# Patient Record
Sex: FEMALE | Race: WHITE | NOT HISPANIC OR LATINO | Employment: STUDENT | URBAN - METROPOLITAN AREA
[De-identification: names, ages, dates, MRNs, and addresses within clinical notes are randomized per-mention and may not be internally consistent; named-entity substitution may affect disease eponyms.]

---
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
## 2021-08-09 ENCOUNTER — OFFICE VISIT (OUTPATIENT)
Dept: OBGYN CLINIC | Facility: CLINIC | Age: 21
End: 2021-08-09

## 2021-08-09 VITALS
SYSTOLIC BLOOD PRESSURE: 132 MMHG | HEIGHT: 64 IN | DIASTOLIC BLOOD PRESSURE: 84 MMHG | BODY MASS INDEX: 30.8 KG/M2 | HEART RATE: 87 BPM | WEIGHT: 180.4 LBS

## 2021-08-09 DIAGNOSIS — N89.8 VAGINAL ITCHING: ICD-10-CM

## 2021-08-09 DIAGNOSIS — B37.3 VAGINAL YEAST INFECTION: Primary | ICD-10-CM

## 2021-08-09 DIAGNOSIS — Z11.3 ROUTINE SCREENING FOR STI (SEXUALLY TRANSMITTED INFECTION): ICD-10-CM

## 2021-08-09 DIAGNOSIS — Z72.51 UNPROTECTED SEX: ICD-10-CM

## 2021-08-09 LAB
BV WHIFF TEST VAG QL: NEGATIVE
CLUE CELLS SPEC QL WET PREP: NEGATIVE
PH SMN: 4 [PH]
SL AMB POCT URINE HCG: NEGATIVE
SL AMB POCT WET MOUNT: POSITIVE
T VAGINALIS VAG QL WET PREP: NEGATIVE
YEAST VAG QL WET PREP: POSITIVE

## 2021-08-09 PROCEDURE — 99202 OFFICE O/P NEW SF 15 MIN: CPT | Performed by: NURSE PRACTITIONER

## 2021-08-09 PROCEDURE — 87591 N.GONORRHOEAE DNA AMP PROB: CPT | Performed by: NURSE PRACTITIONER

## 2021-08-09 PROCEDURE — 87491 CHLMYD TRACH DNA AMP PROBE: CPT | Performed by: NURSE PRACTITIONER

## 2021-08-09 PROCEDURE — 81025 URINE PREGNANCY TEST: CPT | Performed by: NURSE PRACTITIONER

## 2021-08-09 PROCEDURE — 87210 SMEAR WET MOUNT SALINE/INK: CPT | Performed by: NURSE PRACTITIONER

## 2021-08-09 RX ORDER — FLUCONAZOLE 150 MG/1
150 TABLET ORAL ONCE
Qty: 1 TABLET | Refills: 0 | Status: SHIPPED | OUTPATIENT
Start: 2021-08-09 | End: 2021-08-09

## 2021-08-09 NOTE — PATIENT INSTRUCTIONS
Yeast Infection   AMBULATORY CARE:   A yeast infection , or vaginal candidiasis, is a common vaginal infection  A yeast infection is caused by a fungus, or yeast-like germ  Fungi are normally found in your vagina  Too many fungi can cause an infection  Common signs and symptoms:   · Thick, white, cheese-like discharge from your vagina    · Itching, swelling, and redness in your vagina    · Pain or burning when you urinate    · Pain during sexual intercourse    Call your doctor or gynecologist if:   · You have a fever and chills  · You develop abdominal or pelvic pain  · Your discharge is bloody and it is not your monthly period  · Your signs and symptoms get worse, even after treatment  · You have questions or concerns about your condition or care  Treatment for a yeast infection  includes medicines to treat the fungal infection and decrease inflammation  The medicine may be a pill, cream, ointment, or vaginal tablet or suppository  Keep your vagina healthy:   · Clean your genital area with mild soap and warm water each day  Do not get soap inside your vagina  Gently dry the area after washing  Do not use hot tubs  The heat and moisture from hot tubs can increase your risk for another yeast infection  · Always wipe from front to back  after you use the toilet  This prevents spreading bacteria from your rectal area into your vagina  · Do not wear tight-fitting clothes or undergarments  for long periods of time  Wear cotton underwear during the day  Cotton helps keep your genital area dry and does not hold in warmth or moisture  Do not wear underwear at night  · Do not douche  or use feminine hygiene sprays or bubble bath  Do not use pads or tampons that are scented, or colored or perfumed toilet paper  · Do not have sex until your symptoms go away  Have your partner wear a condom until you complete your course of medication      · Ask your healthcare provider about birth control options if necessary  Condoms have latex and diaphragms have gel that kills sperm  Both of these may irritate your genital area  Follow up with your doctor or gynecologist as directed:  Write down your questions so you remember to ask them during your visits  © Copyright [employer] 2021 Information is for End User's use only and may not be sold, redistributed or otherwise used for commercial purposes  All illustrations and images included in CareNotes® are the copyrighted property of [employer]  or Ryan Urena  The above information is an  only  It is not intended as medical advice for individual conditions or treatments  Talk to your doctor, nurse or pharmacist before following any medical regimen to see if it is safe and effective for you

## 2021-08-09 NOTE — PROGRESS NOTES
Assessment/Plan:    No problem-specific Assessment & Plan notes found for this encounter  Return to office after 22st birthday for 1st Pap smear     Diagnoses and all orders for this visit:    Vaginal yeast infection  -     fluconazole (DIFLUCAN) 150 mg tablet; Take 1 tablet (150 mg total) by mouth once for 1 dose   Discussed with patient yeast infection and treatment  Desires fluconazole for treatment  Reviewed prevention,    recommend to  bathe with mild soap such as Dove unscented,   avoid scented products, change clothing when moist,  avoid tight restrictive clothing  Reviewed to avoid sugars in simple carbohydrates  Vaginal itching  -     POCT wet mount  Positive for yeast hyphae  Routine screening for STI (sexually transmitted infection)  Will call results patient  Reviewed STD prevention, recommend condom use  Declines contraception today  Return to office if desires contraception        Subjective:      Patient ID: Reagan Weinberg is a 22 y o  female  HPI 23 yo new pt G0 here with itching and labia feels irritated  Has a thick curdy vaginal discharge  Denies any antibiotic use  A hot shower makes it better  No aggravating factors  Has had her symptoms for 4 days  Sexual debut about 2 weeks ago  First time also for her partner  Patient consents to testing for chlamydia gonorrhea today, declines other STD testing  LMP 7/25/2021  Patient reports she took a Plan B the day and consensual intercourse on 07/24/2021 and then got her menses the day after, normal menses for patient  Briefly reviewed contraception patient declines  Patient reports she is going to abstain will return to office if desires contraception  Reviewed strict condom use when sexually active      Patient believes she was vaccinated for Gardasil       The following portions of the patient's history were reviewed and updated as appropriate: allergies, current medications, past family history, past medical history, past social history, past surgical history and problem list     Review of Systems   Respiratory: Negative  Cardiovascular: Negative  Genitourinary: Positive for vaginal discharge and vaginal pain  Negative for dysuria, frequency, pelvic pain and urgency  Itching in her vagina         Objective:      /84 (BP Location: Left arm, Patient Position: Sitting, Cuff Size: Standard)   Pulse 87   Ht 5' 4" (1 626 m)   Wt 81 8 kg (180 lb 6 4 oz)   LMP 07/25/2021   BMI 30 97 kg/m²          Physical Exam  Constitutional:       Appearance: Normal appearance  Pulmonary:      Effort: Pulmonary effort is normal    Abdominal:      Palpations: Abdomen is soft  Tenderness: There is no abdominal tenderness  Skin:     General: Skin is warm and dry  Neurological:      Mental Status: She is oriented to person, place, and time  Psychiatric:         Mood and Affect: Mood normal          Behavior: Behavior normal        external genitalia -slight erythema , no lesions  Vagina-thick white curdy discharge, small amount  Cervix-small, negative CMT  Uterus-anteverted, nontender  Adnexa-no masses nontender      Wet mount KOH positive for yeast hyphae, negative for trich, negative for BV

## 2021-08-10 LAB
C TRACH DNA SPEC QL NAA+PROBE: NEGATIVE
N GONORRHOEA DNA SPEC QL NAA+PROBE: NEGATIVE

## 2021-08-12 ENCOUNTER — TELEPHONE (OUTPATIENT)
Dept: OBGYN CLINIC | Facility: CLINIC | Age: 21
End: 2021-08-12

## 2021-08-12 NOTE — TELEPHONE ENCOUNTER
----- Message from Beltran Lemons, [address] J[address] Urena sent at 8/10/2021 10:10 PM EDT -----   Result is negative, please call patient to inform 
Patient notified of negative culture results 
24.6